# Patient Record
Sex: MALE | Race: BLACK OR AFRICAN AMERICAN | NOT HISPANIC OR LATINO | Employment: UNEMPLOYED | URBAN - METROPOLITAN AREA
[De-identification: names, ages, dates, MRNs, and addresses within clinical notes are randomized per-mention and may not be internally consistent; named-entity substitution may affect disease eponyms.]

---

## 2017-09-04 ENCOUNTER — HOSPITAL ENCOUNTER (OUTPATIENT)
Facility: OTHER | Age: 22
Discharge: HOME | End: 2017-09-05
Attending: EMERGENCY MEDICINE | Admitting: INTERNAL MEDICINE
Payer: MEDICAID

## 2017-09-04 DIAGNOSIS — B20 HIV DISEASE: ICD-10-CM

## 2017-09-04 DIAGNOSIS — G40.909 RECURRENT SEIZURES: Primary | ICD-10-CM

## 2017-09-04 LAB
ALBUMIN SERPL BCP-MCNC: 4.5 G/DL
ALP SERPL-CCNC: 95 U/L
ALT SERPL W/O P-5'-P-CCNC: 17 U/L
ANION GAP SERPL CALC-SCNC: 26 MMOL/L
AST SERPL-CCNC: 23 U/L
BACTERIA #/AREA URNS HPF: ABNORMAL /HPF
BASOPHILS # BLD AUTO: 0.02 K/UL
BASOPHILS NFR BLD: 0.2 %
BILIRUB SERPL-MCNC: 0.3 MG/DL
BILIRUB UR QL STRIP: NEGATIVE
BUN SERPL-MCNC: 13 MG/DL
CALCIUM SERPL-MCNC: 10.4 MG/DL
CHLORIDE SERPL-SCNC: 110 MMOL/L
CLARITY UR: CLEAR
CO2 SERPL-SCNC: 10 MMOL/L
COLOR UR: YELLOW
CREAT SERPL-MCNC: 1.3 MG/DL
DIFFERENTIAL METHOD: ABNORMAL
EOSINOPHIL # BLD AUTO: 0.4 K/UL
EOSINOPHIL NFR BLD: 3 %
ERYTHROCYTE [DISTWIDTH] IN BLOOD BY AUTOMATED COUNT: 13.6 %
EST. GFR  (AFRICAN AMERICAN): >60 ML/MIN/1.73 M^2
EST. GFR  (NON AFRICAN AMERICAN): >60 ML/MIN/1.73 M^2
ETHANOL SERPL-MCNC: <10 MG/DL
GLUCOSE SERPL-MCNC: 106 MG/DL
GLUCOSE UR QL STRIP: NEGATIVE
HCT VFR BLD AUTO: 49.1 %
HGB BLD-MCNC: 16.1 G/DL
HGB UR QL STRIP: ABNORMAL
HYALINE CASTS #/AREA URNS LPF: 2 /LPF
KETONES UR QL STRIP: NEGATIVE
LEUKOCYTE ESTERASE UR QL STRIP: NEGATIVE
LYMPHOCYTES # BLD AUTO: 4.7 K/UL
LYMPHOCYTES NFR BLD: 38.3 %
MCH RBC QN AUTO: 31.1 PG
MCHC RBC AUTO-ENTMCNC: 32.8 G/DL
MCV RBC AUTO: 95 FL
MICROSCOPIC COMMENT: ABNORMAL
MONOCYTES # BLD AUTO: 0.7 K/UL
MONOCYTES NFR BLD: 5.4 %
NEUTROPHILS # BLD AUTO: 6.5 K/UL
NEUTROPHILS NFR BLD: 52.9 %
NITRITE UR QL STRIP: NEGATIVE
PH UR STRIP: 6 [PH] (ref 5–8)
PLATELET # BLD AUTO: 243 K/UL
PMV BLD AUTO: 9.5 FL
POTASSIUM SERPL-SCNC: 4.4 MMOL/L
PROT SERPL-MCNC: 9.3 G/DL
PROT UR QL STRIP: ABNORMAL
RBC # BLD AUTO: 5.17 M/UL
RBC #/AREA URNS HPF: 4 /HPF (ref 0–4)
SODIUM SERPL-SCNC: 146 MMOL/L
SP GR UR STRIP: >=1.03 (ref 1–1.03)
SQUAMOUS #/AREA URNS HPF: 5 /HPF
TSH SERPL DL<=0.005 MIU/L-ACNC: 1.29 UIU/ML
URN SPEC COLLECT METH UR: ABNORMAL
UROBILINOGEN UR STRIP-ACNC: NEGATIVE EU/DL
WBC # BLD AUTO: 12.33 K/UL
WBC #/AREA URNS HPF: 2 /HPF (ref 0–5)
WBC CLUMPS URNS QL MICRO: ABNORMAL
YEAST URNS QL MICRO: ABNORMAL

## 2017-09-04 PROCEDURE — 63600175 PHARM REV CODE 636 W HCPCS: Performed by: PHYSICIAN ASSISTANT

## 2017-09-04 PROCEDURE — 25000003 PHARM REV CODE 250: Performed by: EMERGENCY MEDICINE

## 2017-09-04 PROCEDURE — 86361 T CELL ABSOLUTE COUNT: CPT

## 2017-09-04 PROCEDURE — 80320 DRUG SCREEN QUANTALCOHOLS: CPT

## 2017-09-04 PROCEDURE — 99220 PR INITIAL OBSERVATION CARE,LEVL III: CPT | Mod: ,,, | Performed by: NURSE PRACTITIONER

## 2017-09-04 PROCEDURE — 80177 DRUG SCRN QUAN LEVETIRACETAM: CPT

## 2017-09-04 PROCEDURE — 96365 THER/PROPH/DIAG IV INF INIT: CPT

## 2017-09-04 PROCEDURE — G0378 HOSPITAL OBSERVATION PER HR: HCPCS

## 2017-09-04 PROCEDURE — 99285 EMERGENCY DEPT VISIT HI MDM: CPT | Mod: 25

## 2017-09-04 PROCEDURE — 63600175 PHARM REV CODE 636 W HCPCS: Performed by: EMERGENCY MEDICINE

## 2017-09-04 PROCEDURE — 81000 URINALYSIS NONAUTO W/SCOPE: CPT

## 2017-09-04 PROCEDURE — 96361 HYDRATE IV INFUSION ADD-ON: CPT

## 2017-09-04 PROCEDURE — 85025 COMPLETE CBC W/AUTO DIFF WBC: CPT

## 2017-09-04 PROCEDURE — 96375 TX/PRO/DX INJ NEW DRUG ADDON: CPT

## 2017-09-04 PROCEDURE — 84443 ASSAY THYROID STIM HORMONE: CPT

## 2017-09-04 PROCEDURE — 87536 HIV-1 QUANT&REVRSE TRNSCRPJ: CPT

## 2017-09-04 PROCEDURE — 25000003 PHARM REV CODE 250: Performed by: NURSE PRACTITIONER

## 2017-09-04 PROCEDURE — 80053 COMPREHEN METABOLIC PANEL: CPT

## 2017-09-04 RX ORDER — LEVETIRACETAM 500 MG/1
500 TABLET ORAL 2 TIMES DAILY
Status: DISCONTINUED | OUTPATIENT
Start: 2017-09-04 | End: 2017-09-05 | Stop reason: HOSPADM

## 2017-09-04 RX ORDER — LORAZEPAM 2 MG/ML
INJECTION INTRAMUSCULAR
Status: DISCONTINUED
Start: 2017-09-04 | End: 2017-09-04

## 2017-09-04 RX ORDER — ACETAMINOPHEN 325 MG/1
650 TABLET ORAL EVERY 4 HOURS PRN
Status: DISCONTINUED | OUTPATIENT
Start: 2017-09-04 | End: 2017-09-05 | Stop reason: HOSPADM

## 2017-09-04 RX ORDER — LEVETIRACETAM 10 MG/ML
1000 INJECTION INTRAVASCULAR
Status: COMPLETED | OUTPATIENT
Start: 2017-09-04 | End: 2017-09-04

## 2017-09-04 RX ORDER — AMOXICILLIN 250 MG
1 CAPSULE ORAL 2 TIMES DAILY PRN
Status: DISCONTINUED | OUTPATIENT
Start: 2017-09-04 | End: 2017-09-05 | Stop reason: HOSPADM

## 2017-09-04 RX ORDER — LEVETIRACETAM 500 MG/1
1000 TABLET ORAL
Status: ACTIVE | OUTPATIENT
Start: 2017-09-04 | End: 2017-09-05

## 2017-09-04 RX ORDER — ENOXAPARIN SODIUM 100 MG/ML
40 INJECTION SUBCUTANEOUS EVERY 24 HOURS
Status: DISCONTINUED | OUTPATIENT
Start: 2017-09-05 | End: 2017-09-05 | Stop reason: HOSPADM

## 2017-09-04 RX ORDER — ACETAMINOPHEN 500 MG
1000 TABLET ORAL
Status: ACTIVE | OUTPATIENT
Start: 2017-09-04 | End: 2017-09-05

## 2017-09-04 RX ORDER — LEVETIRACETAM 500 MG/1
1000 TABLET ORAL DAILY
COMMUNITY

## 2017-09-04 RX ORDER — ONDANSETRON 2 MG/ML
4 INJECTION INTRAMUSCULAR; INTRAVENOUS EVERY 12 HOURS PRN
Status: DISCONTINUED | OUTPATIENT
Start: 2017-09-04 | End: 2017-09-05 | Stop reason: HOSPADM

## 2017-09-04 RX ORDER — SODIUM CHLORIDE 9 MG/ML
INJECTION, SOLUTION INTRAVENOUS CONTINUOUS
Status: DISCONTINUED | OUTPATIENT
Start: 2017-09-04 | End: 2017-09-05 | Stop reason: HOSPADM

## 2017-09-04 RX ADMIN — LEVETIRACETAM 1000 MG: 10 INJECTION INTRAVENOUS at 08:09

## 2017-09-04 RX ADMIN — SODIUM CHLORIDE 1000 ML: 0.9 INJECTION, SOLUTION INTRAVENOUS at 07:09

## 2017-09-04 RX ADMIN — LORAZEPAM 1 MG: 2 INJECTION INTRAMUSCULAR; INTRAVENOUS at 07:09

## 2017-09-04 RX ADMIN — SODIUM CHLORIDE: 0.9 INJECTION, SOLUTION INTRAVENOUS at 11:09

## 2017-09-04 RX ADMIN — LEVETIRACETAM 500 MG: 500 TABLET ORAL at 11:09

## 2017-09-04 NOTE — ED NOTES
"Pt arrived by personal transportation. Friend at bedside reports that pt had series of seizures since 1600. "He had about 6 or 7 that I can count. He would get up and say he alright and then he'd have another one." Pt awake and slow to respond. Reports compliance with Keppra daily for hx seizures but did not take meds today. Also pt is requesting to leave facility. Lethargic. Encouraged to stay. Friend reports that pt fell and hit back against tub but denies head injury. Denies pain. Pt answers orientation questions, but does not know what hospital he is at. NAD. Will cont to monitor.  "

## 2017-09-05 VITALS
WEIGHT: 169.31 LBS | OXYGEN SATURATION: 98 % | DIASTOLIC BLOOD PRESSURE: 67 MMHG | BODY MASS INDEX: 22.44 KG/M2 | SYSTOLIC BLOOD PRESSURE: 118 MMHG | HEIGHT: 73 IN | RESPIRATION RATE: 16 BRPM | HEART RATE: 90 BPM | TEMPERATURE: 99 F

## 2017-09-05 LAB
ALBUMIN SERPL BCP-MCNC: 3.6 G/DL
ALP SERPL-CCNC: 79 U/L
ALT SERPL W/O P-5'-P-CCNC: 12 U/L
ANION GAP SERPL CALC-SCNC: 8 MMOL/L
AST SERPL-CCNC: 19 U/L
BASOPHILS # BLD AUTO: 0.02 K/UL
BASOPHILS NFR BLD: 0.2 %
BILIRUB SERPL-MCNC: 0.5 MG/DL
BUN SERPL-MCNC: 9 MG/DL
CALCIUM SERPL-MCNC: 9.1 MG/DL
CD3+CD4+ CELLS # BLD: 699 CELLS/UL (ref 300–1400)
CD3+CD4+ CELLS NFR BLD: 14.7 % (ref 28–57)
CHLORIDE SERPL-SCNC: 108 MMOL/L
CO2 SERPL-SCNC: 22 MMOL/L
CREAT SERPL-MCNC: 1 MG/DL
DIFFERENTIAL METHOD: ABNORMAL
EOSINOPHIL # BLD AUTO: 0.2 K/UL
EOSINOPHIL NFR BLD: 1.8 %
ERYTHROCYTE [DISTWIDTH] IN BLOOD BY AUTOMATED COUNT: 13.7 %
EST. GFR  (AFRICAN AMERICAN): >60 ML/MIN/1.73 M^2
EST. GFR  (NON AFRICAN AMERICAN): >60 ML/MIN/1.73 M^2
GLUCOSE SERPL-MCNC: 93 MG/DL
HCT VFR BLD AUTO: 41.5 %
HGB BLD-MCNC: 13.9 G/DL
LYMPHOCYTES # BLD AUTO: 2.5 K/UL
LYMPHOCYTES NFR BLD: 25.8 %
MAGNESIUM SERPL-MCNC: 2.4 MG/DL
MCH RBC QN AUTO: 30.5 PG
MCHC RBC AUTO-ENTMCNC: 33.5 G/DL
MCV RBC AUTO: 91 FL
MONOCYTES # BLD AUTO: 0.8 K/UL
MONOCYTES NFR BLD: 8 %
NEUTROPHILS # BLD AUTO: 6.3 K/UL
NEUTROPHILS NFR BLD: 64.1 %
PHOSPHATE SERPL-MCNC: 3.9 MG/DL
PLATELET # BLD AUTO: 199 K/UL
PMV BLD AUTO: 8.9 FL
POTASSIUM SERPL-SCNC: 4.1 MMOL/L
PROT SERPL-MCNC: 7.1 G/DL
RBC # BLD AUTO: 4.55 M/UL
SODIUM SERPL-SCNC: 138 MMOL/L
WBC # BLD AUTO: 9.85 K/UL

## 2017-09-05 PROCEDURE — 83735 ASSAY OF MAGNESIUM: CPT

## 2017-09-05 PROCEDURE — 85025 COMPLETE CBC W/AUTO DIFF WBC: CPT

## 2017-09-05 PROCEDURE — 90471 IMMUNIZATION ADMIN: CPT | Performed by: INTERNAL MEDICINE

## 2017-09-05 PROCEDURE — 63600175 PHARM REV CODE 636 W HCPCS: Performed by: INTERNAL MEDICINE

## 2017-09-05 PROCEDURE — 80053 COMPREHEN METABOLIC PANEL: CPT

## 2017-09-05 PROCEDURE — 84100 ASSAY OF PHOSPHORUS: CPT

## 2017-09-05 PROCEDURE — 36415 COLL VENOUS BLD VENIPUNCTURE: CPT

## 2017-09-05 PROCEDURE — G0378 HOSPITAL OBSERVATION PER HR: HCPCS

## 2017-09-05 PROCEDURE — 25000003 PHARM REV CODE 250: Performed by: EMERGENCY MEDICINE

## 2017-09-05 PROCEDURE — 99900035 HC TECH TIME PER 15 MIN (STAT)

## 2017-09-05 PROCEDURE — 99225 PR SUBSEQUENT OBSERVATION CARE,LEVEL II: CPT | Mod: ,,, | Performed by: INTERNAL MEDICINE

## 2017-09-05 PROCEDURE — 25000003 PHARM REV CODE 250: Performed by: NURSE PRACTITIONER

## 2017-09-05 PROCEDURE — 90670 PCV13 VACCINE IM: CPT | Performed by: INTERNAL MEDICINE

## 2017-09-05 RX ADMIN — LEVETIRACETAM 500 MG: 500 TABLET ORAL at 09:09

## 2017-09-05 RX ADMIN — PNEUMOCOCCAL 13-VALENT CONJUGATE VACCINE 0.5 ML: 2.2; 2.2; 2.2; 2.2; 2.2; 4.4; 2.2; 2.2; 2.2; 2.2; 2.2; 2.2; 2.2 INJECTION, SUSPENSION INTRAMUSCULAR at 09:09

## 2017-09-05 RX ADMIN — SODIUM CHLORIDE: 0.9 INJECTION, SOLUTION INTRAVENOUS at 09:09

## 2017-09-05 NOTE — ED NOTES
Dr. Mcginnis at bedside. Pt remains post ictal. Moving around bed, eyes closed, and moaning, not responding to nurse. Tachycardic. Has NRB mask in place. Nurse at bedside. Will cont to monitor.

## 2017-09-05 NOTE — HPI
This is a 21 y.o. Male, with history of HIV, who presents with complaint of seizure that occurred today. A friend witnessed the patient experience six seizures. Pt can not recall recent events. He reports non-specific headache that began after seizures, but denies fever, chills, nausea, vomiting, abdominal pain, chest pain, SOB, myalgias, pain to the extremities, neck pain, or back pain. Symptoms are described as constant. Pt is compliant with Keppra (1000 mg). He last experienced a seizure one week ago, and denies use of alcohol or illicit drugs.

## 2017-09-05 NOTE — H&P
[facility] Medicine  History & Physical    Patient Name: Azam Boland  MRN: [id number]  Admission Date: 9/4/2017  Attending Physician: Omar Mcginnis MD   Primary Care Provider: Primary Doctor No         Patient information was obtained from patient, caregiver / friend and ER records.     Subjective:     Principal Problem:Recurrent seizures    Chief Complaint:   Chief Complaint   Patient presents with    Seizures     pt fiend states he had 6 sz  today.        HPI: This is a 21 y.o. Male, with history of HIV, who presents with complaint of seizure that occurred today. A friend witnessed the patient experience six seizures. Pt can not recall recent events. He reports non-specific headache that began after seizures, but denies fever, chills, nausea, vomiting, abdominal pain, chest pain, SOB, myalgias, pain to the extremities, neck pain, or back pain. Symptoms are described as constant. Pt is compliant with Keppra (1000 mg). He last experienced a seizure one week ago, and denies use of alcohol or illicit drugs.     Past Medical History:   Diagnosis Date    HIV (human immunodeficiency virus infection)     Seizures        History reviewed. No pertinent surgical history.    Review of patient's allergies indicates:  No Known Allergies    No current facility-administered medications on file prior to encounter.      Current Outpatient Prescriptions on File Prior to Encounter   Medication Sig    emtricitab-rilpivirine-tenofov (COMPLERA) 200- mg Tab Take by mouth once daily.    permethrin (ELIMITE) 5 % cream Apply to body from neck down to toes and leave on 8-12 hours then shower off.   Repeat in 2 weeks if rash not completely gone    valacyclovir (VALTREX) 500 MG tablet Take 500 mg by mouth 2 (two) times daily.     Family History     Problem Relation (Age of Onset)    Diabetes Mother        Social History Main Topics    Smoking status: Current Every Day Smoker     Packs/day: 0.50     Smokeless tobacco: Never Used    Alcohol use Yes      Comment: social    Drug use:      Types: Marijuana    Sexual activity: Yes     Partners: Male     Birth control/ protection: None     Review of Systems   Unable to perform ROS: Patient unresponsive     Objective:     Vital Signs (Most Recent):  Temp: 98.7 °F (37.1 °C) (09/04/17 1836)  Pulse: 100 (09/04/17 2030)  Resp: 16 (09/04/17 1959)  BP: 110/63 (09/04/17 2030)  SpO2: 97 % (09/04/17 2030) Vital Signs (24h Range):  Temp:  [98.7 °F (37.1 °C)] 98.7 °F (37.1 °C)  Pulse:  [] 100  Resp:  [16-33] 16  SpO2:  [96 %-99 %] 97 %  BP: (109-120)/(53-63) 110/63     Weight: 77.1 kg (170 lb)  Body mass index is 22.43 kg/m².    Physical Exam   Constitutional: He appears well-developed and well-nourished.   HENT:   Head: Normocephalic.   Eyes: Conjunctivae are normal.   Neck: Normal range of motion. Neck supple.   Cardiovascular: Normal rate, regular rhythm, normal heart sounds and intact distal pulses.    Pulmonary/Chest: Effort normal and breath sounds normal.   Abdominal: Soft. Bowel sounds are normal. He exhibits no distension. There is no tenderness.   Musculoskeletal: Normal range of motion.   Neurological: He is unresponsive. GCS eye subscore is 1. GCS verbal subscore is 1. GCS motor subscore is 5.   Patient has received Ativan and Keppra for active seizures. I believe this is the cause of his unresponsiveness.   Skin: Skin is warm and dry. Capillary refill takes 2 to 3 seconds. There is pallor.        Significant Labs:   CBC:   Recent Labs  Lab 09/04/17 1934   WBC 12.33   HGB 16.1   HCT 49.1        CMP:   Recent Labs  Lab 09/04/17 1934   *   K 4.4      CO2 10*      BUN 13   CREATININE 1.3   CALCIUM 10.4   PROT 9.3*   ALBUMIN 4.5   BILITOT 0.3   ALKPHOS 95   AST 23   ALT 17   ANIONGAP 26*   EGFRNONAA >60       Significant Imaging: I have reviewed all pertinent imaging results/findings within the past 24 hours.    Assessment/Plan:     *  Recurrent seizures    -Roommate states 6 or 7 seizures today  -Keppra 1000mg given. Home Keppra dose started  Keppra level pending  -Consult Neurology  -Suspect noncompliance with meds after discussing with roommate          HIV disease    -Roommate doesn't know if he's been compliant with meds  CD4 pending  HIV RNA pending            VTE Risk Mitigation     None             Jimmy Hoover, NP  Department of Hospital Medicine   [facility], [place], operated by [facility]

## 2017-09-05 NOTE — PLAN OF CARE
Problem: Patient Care Overview  Goal: Plan of Care Review  Outcome: Outcome(s) achieved Date Met: 09/05/17  Remains free from fall, injury, and skin breakdown. Ambulates independently to bathroom. VS stable on RA and afebrile. Positions self independently. Denied pain. Seizure precautions maintained. Morning meds/pneumo vaccine given. Tolerating ordered diet. IV site WNL. Plan of care reviewed with patient and all questions answered. Bed low, locked w/ bed alarm on. Call light within reach. Purposeful rounding performed. No other complaints at this time.    Eager & in agreement w/ DC. VU of DC instructions explained. IV removed w/ cath tip intact, WNL.     Patient refused influenza vaccine.

## 2017-09-05 NOTE — ED PROVIDER NOTES
Encounter Date: 9/4/2017    SCRIBE #1 NOTE: I, Love Onofre , am scribing for, and in the presence of, Dr. Mcginnis.       History     Chief Complaint   Patient presents with    Seizures     pt camille states he had 6 sz  today.     Time seen by provider: 7:06 PM    This is a 21 y.o. male, with history of HIV, who presents with complaint of seizure that occurred today. A friend witnessed the patient experience six seizures. Pt can not recall recent events. He reports non-specific headache that began after seizures, but denies fever, chills, nausea, vomiting, abdominal pain, chest pain, SOB, myalgias, pain to the extremities, neck pain, or back pain. Symptoms are described as constant. Pt is compliant with Keppra (1000 mg). His last seizure occurred one week ago. Pt denies use of alcohol or illicit drugs.       The history is provided by the patient and a friend.     Review of patient's allergies indicates:  No Known Allergies  Past Medical History:   Diagnosis Date    HIV (human immunodeficiency virus infection)     Seizures      History reviewed. No pertinent surgical history.  Family History   Problem Relation Age of Onset    Diabetes Mother      Social History   Substance Use Topics    Smoking status: Current Every Day Smoker     Packs/day: 0.50    Smokeless tobacco: Never Used    Alcohol use Yes      Comment: social     Review of Systems   Constitutional: Negative for chills and fever.   HENT: Negative for congestion, rhinorrhea and sore throat.    Respiratory: Negative for cough and shortness of breath.    Cardiovascular: Negative for chest pain.   Gastrointestinal: Negative for abdominal pain, diarrhea, nausea and vomiting.   Endocrine: Negative for polyuria.   Genitourinary: Negative for decreased urine volume and dysuria.   Musculoskeletal: Negative for back pain and neck pain.        Negative for head trauma or pain to the extremities.    Skin: Negative for rash.   Allergic/Immunologic: Negative for  immunocompromised state.   Neurological: Positive for seizures and headaches. Negative for dizziness and weakness.   Hematological: Does not bruise/bleed easily.   Psychiatric/Behavioral: Negative for confusion.       Physical Exam     Initial Vitals [09/04/17 1836]   BP Pulse Resp Temp SpO2   (!) 120/59 108 18 98.7 °F (37.1 °C) 96 %      MAP       79.33         Physical Exam    Nursing note and vitals reviewed.  Constitutional: He appears well-developed and well-nourished. He is not diaphoretic. No distress.   HENT:   Head: Normocephalic and atraumatic.   Right Ear: External ear normal.   Left Ear: External ear normal.   Eyes: Conjunctivae and EOM are normal. Pupils are equal, round, and reactive to light. Right eye exhibits no discharge. Left eye exhibits no discharge.   Neck: Normal range of motion. Neck supple. No tracheal deviation present.   Cardiovascular: Normal rate, regular rhythm, normal heart sounds and intact distal pulses. Exam reveals no gallop and no friction rub.    No murmur heard.  Pulmonary/Chest: Breath sounds normal. No stridor. No respiratory distress. He has no wheezes. He has no rhonchi. He has no rales.   Abdominal: Soft. Bowel sounds are normal. He exhibits no distension. There is no tenderness. There is no rebound and no guarding.   Musculoskeletal: Normal range of motion. He exhibits no edema or tenderness.   Neurological: He is alert and oriented to person, place, and time. He has normal strength. No cranial nerve deficit.   Cranial nerves II through XII grossly intact.  5/5 motor strength all 4 extremities.  Sensation is normal.  Finger to nose normal.  Gait normal.  Speech and cognition is normal.  No focal neurologic deficit.   Skin: Skin is warm and dry. Capillary refill takes less than 2 seconds. No erythema. No pallor.   Psychiatric: He has a normal mood and affect. Thought content normal.         ED Course   Critical Care  Date/Time: 9/4/2017 7:24 PM  Performed by: RHIANNON GLOVER  HEIKE  Authorized by: RHIANNON GLOVER   Direct patient critical care time: 10 minutes  Additional history critical care time: 10 minutes  Ordering / reviewing critical care time: 5 minutes  Documentation critical care time: 5 minutes  Consulting other physicians critical care time: 5 minutes  Total critical care time (exclusive of procedural time) : 35 minutes  Critical care time was exclusive of separately billable procedures and treating other patients.  Critical care was necessary to treat or prevent imminent or life-threatening deterioration of the following conditions: CNS failure or compromise.  Critical care was time spent personally by me on the following activities: examination of patient, obtaining history from patient or surrogate, ordering and performing treatments and interventions, ordering and review of laboratory studies, ordering and review of radiographic studies, re-evaluation of patient's condition, review of old charts, discussions with consultants and evaluation of patient's response to treatment.        Labs Reviewed - No data to display   Imaging Results    None                 Medical Decision Making:   Clinical Tests:   Lab Tests: Ordered and Reviewed  Radiological Study: Ordered and Reviewed  ED Management:  Well-appearing patient presents complaining of seizures.  Reports he often has weakly breakthrough seizures.  Reports compliance with his medications.  Denies any recent fevers or headaches.  He is HIV-positive.  I given extra dose of Keppra here and plan to observe.  During his observation he is noted to seize again.  Is given Ativan which results seizure.  CAT scan reveals findings consistent with a seizure disorder, no signs of abscess or hemorrhage or mass.  He is afebrile I do not think LP indicated.  I do think he requires hospitalization for further observation given his reported breakthrough seizures.  I loaded him with Keppra.    HEIKE Glover M.D.  09/05/2017  3:51  AM      9:56 PM- Discussed and consulted case with Jimmy MORIN, who will accept the admission.            Scribe Attestation:   Scribe #1: I performed the above scribed service and the documentation accurately describes the services I performed. I attest to the accuracy of the note.    Attending Attestation:           Physician Attestation for Scribe:  Physician Attestation Statement for Scribe #1: I, Dr. Mcginnis, reviewed documentation, as scribed by Love Onofre  in my presence, and it is both accurate and complete.                 ED Course      Clinical Impression:     1. Recurrent seizures                                 Omar Mcginnis MD  09/05/17 0351

## 2017-09-05 NOTE — PLAN OF CARE
Problem: Patient Care Overview  Goal: Plan of Care Review  Outcome: Ongoing (interventions implemented as appropriate)  No significant events overnight. Patient remains free from seizure, fall, injury, and skin breakdown. Voiding/ambulation encouraged with assistance. VSS on RA throughout the night. Positions self without assistance. Denies pain. Tele maintained; all alarms active and audible. Neuro checks performed and intact. TEDs/SCDs refused at this time. Plan of care reviewed with patient and all questions answered. Bed low, locked w/ side rails upx2. Call light within reach. Purposeful rounding performed. Resting comfortably in bed, no other complaints at this time.

## 2017-09-05 NOTE — ED NOTES
Witnessed seizure activity occurred. Lasted approx 1 minute and stopped. Airway maintained. Oral cavity suctioned of secretions. Nurse at bedside to protect pt head. Bed low locked. Side rails raised x 2. Pt on cont cardiac, bp, and spo2 monitors. Pt not incontinent of urine or feces during witnessed event.

## 2017-09-05 NOTE — ASSESSMENT & PLAN NOTE
-Roommate states 6 or 7 seizures today  -Keppra 1000mg given. Home Keppra dose started  Keppra level pending  -Consult Neurology  -Suspect noncompliance with meds after discussing with roommate
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
## 2017-09-05 NOTE — ED NOTES
Pt rounding complete.  Pt resting on stretcher with eyes closed, respirations even and unlabored, in no acute distress.  Call light within reach.  Will continue to monitor.  Friend remains at bedside.

## 2017-09-05 NOTE — SUBJECTIVE & OBJECTIVE
Past Medical History:   Diagnosis Date    HIV (human immunodeficiency virus infection)     Seizures        History reviewed. No pertinent surgical history.    Review of patient's allergies indicates:  No Known Allergies    No current facility-administered medications on file prior to encounter.      Current Outpatient Prescriptions on File Prior to Encounter   Medication Sig    emtricitab-rilpivirine-tenofov (COMPLERA) 200- mg Tab Take by mouth once daily.    permethrin (ELIMITE) 5 % cream Apply to body from neck down to toes and leave on 8-12 hours then shower off.   Repeat in 2 weeks if rash not completely gone    valacyclovir (VALTREX) 500 MG tablet Take 500 mg by mouth 2 (two) times daily.     Family History     Problem Relation (Age of Onset)    Diabetes Mother        Social History Main Topics    Smoking status: Current Every Day Smoker     Packs/day: 0.50    Smokeless tobacco: Never Used    Alcohol use Yes      Comment: social    Drug use:      Types: Marijuana    Sexual activity: Yes     Partners: Male     Birth control/ protection: None     Review of Systems   Unable to perform ROS: Patient unresponsive     Objective:     Vital Signs (Most Recent):  Temp: 98.7 °F (37.1 °C) (09/04/17 1836)  Pulse: 100 (09/04/17 2030)  Resp: 16 (09/04/17 1959)  BP: 110/63 (09/04/17 2030)  SpO2: 97 % (09/04/17 2030) Vital Signs (24h Range):  Temp:  [98.7 °F (37.1 °C)] 98.7 °F (37.1 °C)  Pulse:  [] 100  Resp:  [16-33] 16  SpO2:  [96 %-99 %] 97 %  BP: (109-120)/(53-63) 110/63     Weight: 77.1 kg (170 lb)  Body mass index is 22.43 kg/m².    Physical Exam   Constitutional: He appears well-developed and well-nourished.   HENT:   Head: Normocephalic.   Eyes: Conjunctivae are normal.   Neck: Normal range of motion. Neck supple.   Cardiovascular: Normal rate, regular rhythm, normal heart sounds and intact distal pulses.    Pulmonary/Chest: Effort normal and breath sounds normal.   Abdominal: Soft. Bowel sounds  are normal. He exhibits no distension. There is no tenderness.   Musculoskeletal: Normal range of motion.   Neurological: He is unresponsive. GCS eye subscore is 1. GCS verbal subscore is 1. GCS motor subscore is 5.   Patient has received Ativan and Keppra for active seizures. I believe this is the cause of his unresponsiveness.   Skin: Skin is warm and dry. Capillary refill takes 2 to 3 seconds. There is pallor.        Significant Labs:   CBC:   Recent Labs  Lab 09/04/17 1934   WBC 12.33   HGB 16.1   HCT 49.1        CMP:   Recent Labs  Lab 09/04/17 1934   *   K 4.4      CO2 10*      BUN 13   CREATININE 1.3   CALCIUM 10.4   PROT 9.3*   ALBUMIN 4.5   BILITOT 0.3   ALKPHOS 95   AST 23   ALT 17   ANIONGAP 26*   EGFRNONAA >60       Significant Imaging: I have reviewed all pertinent imaging results/findings within the past 24 hours.

## 2017-09-05 NOTE — PLAN OF CARE
9/5/2017    Patient: Azam Boland    YOB: 1995    To whom it may concern,    Azam Boland was admitted to the hospital under my care on 9/4/2017 and was discharged on 9/5/2017.  Please allow him to return to his prior living situation.    If you have any questions or concerns, please don't hesitate to contact the department of hospital medicine at [phone]    Sincerely,    Landon Wong MD    Department of Hospital Medicine

## 2017-09-05 NOTE — PLAN OF CARE
SW met with pt at bedside to complete discharge assessment.  Pt lives in group/transition home, [facility].  Although, pt is independent with ADLs and IADLs, Abel shine [phone] is available to assist pt if needed.  No needs identifed by SW or pt.     09/05/17 1132   Discharge Assessment   Assessment Type Discharge Planning Assessment   Confirmed/corrected address and phone number on facesheet? Yes   Assessment information obtained from? Patient   Communicated expected length of stay with patient/caregiver no   Prior to hospitilization cognitive status: Alert/Oriented   Prior to hospitalization functional status: Independent   Current cognitive status: Alert/Oriented   Current Functional Status: Independent   Able to Return to Prior Arrangements yes   Is patient able to care for self after discharge? Yes   Patient's perception of discharge disposition home or selfcare   Readmission Within The Last 30 Days no previous admission in last 30 days   Patient currently being followed by outpatient case management? No   Equipment Currently Used at Home none   Do you have any problems affording any of your prescribed medications? No   Is the patient taking medications as prescribed? yes   Does the patient have transportation home? Yes   Transportation Available public transportation   Does the patient receive services at the [facility]? No   Discharge Plan A Group Home   Patient/Family In Agreement With Plan yes

## 2017-09-05 NOTE — NURSING
Patient arrived to floor at 2313 AAO but very drowsy, still in a post ictal state via stretcher accompanied by LUAN Chilel PCT and friend. NAD noted. Patient and friend oriented to room and unit. IV intact and infusing without difficulty. Bed in lowest position, side rails upx2, call light in reach. Will continue to monitor.

## 2017-09-05 NOTE — PROGRESS NOTES
Dr Wong looking for patient; patient not in room. Called security. Patient found outside building smoking. Patient back in room.

## 2017-09-06 NOTE — PLAN OF CARE
09/06/17 0814   Final Note   Assessment Type Final Discharge Note   Discharge Disposition Home   What phone number can be called within the next 1-3 days to see how you are doing after discharge? ([phone])   Hospital Follow Up  Appt(s) scheduled? No   Discharge plans and expectations educations in teach back method with documentation complete? Yes

## 2017-09-07 LAB
HIV UQ DATE RECEIVED: NORMAL
HIV UQ DATE REPORTED: NORMAL
HIV1 RNA # SERPL NAA+PROBE: <40 COPIES/ML
HIV1 RNA SERPL NAA+PROBE-LOG#: <1.6 LOG (10) COPIES/ML
HIV1 RNA SERPL QL NAA+PROBE: NOT DETECTED
LEVETIRACETAM SERPL-MCNC: <1 UG/ML (ref 3–60)

## 2017-09-23 ENCOUNTER — HOSPITAL ENCOUNTER (EMERGENCY)
Facility: HOSPITAL | Age: 22
Discharge: HOME | End: 2017-09-23
Payer: MEDICAID

## 2017-09-23 VITALS
SYSTOLIC BLOOD PRESSURE: 113 MMHG | RESPIRATION RATE: 18 BRPM | HEART RATE: 96 BPM | OXYGEN SATURATION: 97 % | DIASTOLIC BLOOD PRESSURE: 53 MMHG | BODY MASS INDEX: 20.54 KG/M2 | TEMPERATURE: 100 F | HEIGHT: 73 IN | WEIGHT: 155 LBS

## 2017-09-23 PROCEDURE — 99282 EMERGENCY DEPT VISIT SF MDM: CPT | Mod: 27

## 2017-09-23 NOTE — ED NOTES
Pt unbuckled himself from stretcher and stood up to walk out. Pt was asked to have a seat on stretcher for IV removal before leaving, pt returned to stretcher.

## 2017-09-23 NOTE — ED NOTES
[facility] notified of pt elopement with IV still in place, Dispatcher stated she will send a unit over to pt residence [facility].

## 2017-09-23 NOTE — ED NOTES
[facility] contacted, [phone] and spoke to Yolanda Mauricio, made aware pt left the ER prior to tx, prior to completion of triage. Informed pt left, ran out and security, ER staff searched for him, police contacted due to him leaving with iv, yolanda mauricio verbalized understanding,

## 2017-09-23 NOTE — ED NOTES
"While pt was being triaged pt stated "You need to give me a bus pass to get home, I am not staying here." Pt was made aware that bus passes are not given. Pt was asked if he had any family or friends that may provide him with a ride home, pt stated he had no one. Pt then stated he did not live alone but no one was able to pick him up.  "

## 2017-09-23 NOTE — ED NOTES
"Pt noted, getting off EMS stretcher and walking to nurse's station, stating " take this out, i'm leaving" instructed to please go back to stretcher to please allow me to talk to EMS and doctor, noted pt walking back to EMS stretcher, EMS reports pt is here for staff complaint of seizure where pt is staying, EMS report they reported seizure activity today, EMS reports no active seizure activity with them, A&Ox3, dr. Gasca made aware of pt request to leave the ER prior to tx, per dr. Gasca pt has the right to leave on his own, upon returning to nurse's station, informed by EMS that pt left the ER thru ambulance entrance upon another EMS walking in, myself and the two EMS that brought pt to ER walk outside in attempt to find pt and removed iv, security contacted and met outside, no visual of pt, nurse Gates will contact police in attempt to have iv discontinue,   "